# Patient Record
Sex: MALE | Race: WHITE
[De-identification: names, ages, dates, MRNs, and addresses within clinical notes are randomized per-mention and may not be internally consistent; named-entity substitution may affect disease eponyms.]

---

## 2018-07-11 ENCOUNTER — HOSPITAL ENCOUNTER (EMERGENCY)
Dept: HOSPITAL 26 - MED | Age: 48
Discharge: LEFT BEFORE DISCHARGE | End: 2018-07-11
Payer: MEDICARE

## 2018-07-11 VITALS — SYSTOLIC BLOOD PRESSURE: 136 MMHG | DIASTOLIC BLOOD PRESSURE: 112 MMHG

## 2018-07-11 VITALS — BODY MASS INDEX: 34.53 KG/M2 | WEIGHT: 220 LBS | HEIGHT: 67 IN

## 2018-07-11 VITALS — DIASTOLIC BLOOD PRESSURE: 84 MMHG | SYSTOLIC BLOOD PRESSURE: 120 MMHG

## 2018-07-11 DIAGNOSIS — T78.2XXA: Primary | ICD-10-CM

## 2018-07-11 DIAGNOSIS — I10: ICD-10-CM
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
## 2018-07-11 DIAGNOSIS — R13.10: ICD-10-CM

## 2018-07-11 DIAGNOSIS — Z79.899: ICD-10-CM

## 2018-07-11 DIAGNOSIS — R06.02: ICD-10-CM

## 2018-07-11 DIAGNOSIS — R21: ICD-10-CM

## 2018-07-11 DIAGNOSIS — E87.6: ICD-10-CM

## 2018-07-11 DIAGNOSIS — D75.1: ICD-10-CM
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
## 2018-07-11 LAB
ALBUMIN FLD-MCNC: 4 G/DL (ref 3.4–5)
AMYLASE SERPL-CCNC: 47 U/L (ref 25–115)
ANION GAP SERPL CALCULATED.3IONS-SCNC: 16.7 MMOL/L (ref 8–16)
AST SERPL-CCNC: 27 U/L (ref 15–37)
BASOPHILS # BLD AUTO: 0.1 K/UL (ref 0–0.22)
BASOPHILS NFR BLD AUTO: 0.4 % (ref 0–2)
BILIRUB SERPL-MCNC: 0.8 MG/DL (ref 0–1)
BUN SERPL-MCNC: 16 MG/DL (ref 7–18)
CHLORIDE SERPL-SCNC: 102 MMOL/L (ref 98–107)
CHOLEST/HDLC SERPL: 4.1 {RATIO} (ref 1–4.5)
CO2 SERPL-SCNC: 25 MMOL/L (ref 21–32)
CREAT SERPL-MCNC: 1.4 MG/DL (ref 0.7–1.3)
EOSINOPHIL # BLD AUTO: 0.1 K/UL (ref 0–0.4)
EOSINOPHIL NFR BLD AUTO: 0.7 % (ref 0–4)
ERYTHROCYTE [DISTWIDTH] IN BLOOD BY AUTOMATED COUNT: 12.4 % (ref 11.6–13.7)
GFR SERPL CREATININE-BSD FRML MDRD: 70 ML/MIN (ref 90–?)
GLUCOSE SERPL-MCNC: 200 MG/DL (ref 74–106)
HCT VFR BLD AUTO: 60.4 % (ref 36–52)
HDLC SERPL-MCNC: 38 MG/DL (ref 40–60)
HGB BLD-MCNC: 20.1 G/DL (ref 12–18)
LDLC SERPL CALC-MCNC: 63 MG/DL (ref 60–100)
LIPASE SERPL-CCNC: 306 U/L (ref 73–393)
LYMPHOCYTES # BLD AUTO: 5.8 K/UL (ref 2–11.5)
LYMPHOCYTES NFR BLD AUTO: 32.7 % (ref 20.5–51.1)
MAGNESIUM SERPL-MCNC: 2 MG/DL (ref 1.8–2.4)
MCH RBC QN AUTO: 32 PG (ref 27–31)
MCHC RBC AUTO-ENTMCNC: 33 G/DL (ref 33–37)
MCV RBC AUTO: 94.9 FL (ref 80–94)
MONOCYTES # BLD AUTO: 0.5 K/UL (ref 0.8–1)
MONOCYTES NFR BLD AUTO: 2.9 % (ref 1.7–9.3)
NEUTROPHILS # BLD AUTO: 11.1 K/UL (ref 1.8–7.7)
NEUTROPHILS NFR BLD AUTO: 63.3 % (ref 42.2–75.2)
PHOSPHATE SERPL-MCNC: 4.5 MG/DL (ref 2.5–4.9)
PLATELET # BLD AUTO: 198 K/UL (ref 140–450)
POTASSIUM SERPL-SCNC: 2.7 MMOL/L (ref 3.5–5.1)
PROTHROMBIN TIME: 11 SECS (ref 10.8–13.4)
RBC # BLD AUTO: 6.37 MIL/UL (ref 4.2–6.1)
SODIUM SERPL-SCNC: 141 MMOL/L (ref 136–145)
T4 FREE SERPL-MCNC: 0.96 NG/DL (ref 0.76–1.46)
TRIGL SERPL-MCNC: 275 MG/DL (ref 30–150)
TSH SERPL DL<=0.05 MIU/L-ACNC: 4.6 UIU/ML (ref 0.34–3.74)
WBC # BLD AUTO: 17.6 K/UL (ref 4.8–10.8)

## 2018-07-11 PROCEDURE — 80053 COMPREHEN METABOLIC PANEL: CPT

## 2018-07-11 PROCEDURE — 84443 ASSAY THYROID STIM HORMONE: CPT

## 2018-07-11 PROCEDURE — 84100 ASSAY OF PHOSPHORUS: CPT

## 2018-07-11 PROCEDURE — 36415 COLL VENOUS BLD VENIPUNCTURE: CPT

## 2018-07-11 PROCEDURE — 84484 ASSAY OF TROPONIN QUANT: CPT

## 2018-07-11 PROCEDURE — 85730 THROMBOPLASTIN TIME PARTIAL: CPT

## 2018-07-11 PROCEDURE — 83690 ASSAY OF LIPASE: CPT

## 2018-07-11 PROCEDURE — 71045 X-RAY EXAM CHEST 1 VIEW: CPT

## 2018-07-11 PROCEDURE — 99291 CRITICAL CARE FIRST HOUR: CPT

## 2018-07-11 PROCEDURE — 85025 COMPLETE CBC W/AUTO DIFF WBC: CPT

## 2018-07-11 PROCEDURE — 85610 PROTHROMBIN TIME: CPT

## 2018-07-11 PROCEDURE — 80061 LIPID PANEL: CPT

## 2018-07-11 PROCEDURE — 96372 THER/PROPH/DIAG INJ SC/IM: CPT

## 2018-07-11 PROCEDURE — 84439 ASSAY OF FREE THYROXINE: CPT

## 2018-07-11 PROCEDURE — 82150 ASSAY OF AMYLASE: CPT

## 2018-07-11 PROCEDURE — 83880 ASSAY OF NATRIURETIC PEPTIDE: CPT

## 2018-07-11 PROCEDURE — 94640 AIRWAY INHALATION TREATMENT: CPT

## 2018-07-11 PROCEDURE — 83735 ASSAY OF MAGNESIUM: CPT

## 2018-07-11 PROCEDURE — 82948 REAGENT STRIP/BLOOD GLUCOSE: CPT

## 2018-07-11 PROCEDURE — 83036 HEMOGLOBIN GLYCOSYLATED A1C: CPT

## 2018-07-11 NOTE — NUR
pt leaving AMA at this time. Pt does not want to stay over night in the 
hospital. Pt educated on why it is vital to stay for monitoring. Pt agrred to 
chest xray prior to leaving. ER MD notified and to speak with pt.

## 2018-07-11 NOTE — NUR
47 YO M BIBA W/ SEVERE AND UNKNOWN ALLERGIC REACTION TO BEE STING WHILE 
GARDENING. EPI 2MG IM,BENADRYL 25 MG IV, ALBUTEROL TX GIVEN FIELD. 
SOB/DIFFICULT = TO SWALLOW ON SCENE. PER VERBAL ORDER, 0.3 MG EPI TO BE GIVEN 
NOW SUBQ, AND SOLU-MEDROL 125MG IM GIVEN NOW AND RT CALLED FOR ADDITIONAL 
BREATHING TREATMENT. PT O2 SAT AT 98/97% AT THIS TIME. PERIO-ORBITAL SWELLING 
BILATERALLY AND SWOLLEN LIPS. PT REPORTS THAT HE FEELS THROAT TIGHTNESS, BUT IS 
ABLE TO SPEAK IN FULL, APPROPRIATE, AND COMPLETE SENTENCES. PT PLACED NEAR 
NURSES STATION FOR CLOSE MONITORING. VSS AT THIS TIME. LUNG ZAVALA W/ BILATERAL 
WHEEZING, BUT IMPROVED SINCE ARRIVAL. PT AAOX4. GCS 15. CMS INTACT, RR SLOWING, 
BUT STILL TACHYPNIC. ER MD LYNN AT BEDSIDE. DTR AT BEDSIDE. SAFETY 
PRECAUTIONS IN PLACE. WILL CONTINUE TO MONIOR.

## 2018-07-11 NOTE — NUR
ADMTTING DX: ALLERGIC REACTIONS PATIENT DENIES COPD/ASTHMA AWAKE AND ALERT 
EDUCTIOON PROVIDED TO NAHEED WITH ACKNOWLEDGEMENT ON [facility] THERAPY AND 
RESPIRATORY [facility] THERAPY GIVEN AS OREDERED TOLERATED WEL WITHOUT ADVERES 
REACTIONS NOTED

## 2018-07-11 NOTE — NUR
CRITICAL LAB VALUE RECEIVED AT THIS TIME. HEMAGLOBIN 20.1, HEMATICRIT 60.4, AND 
POTASSIUM 2.7. ER MD LYNN NOTIFIED.

## 2018-07-11 NOTE — NUR
PT RR MORE EVEN AND UNLABORED AT THIS TIME. LUNG ZAVALA ARE MORE CLEAR AT THIS 
TIME. VSS. SAFETY PRECAUTIONS IN PLACE. WILL CONTINUE TO MONITOR.

## 2019-07-03 ENCOUNTER — HOSPITAL ENCOUNTER (EMERGENCY)
Dept: HOSPITAL 26 - MED | Age: 49
Discharge: HOME | End: 2019-07-03
Payer: COMMERCIAL

## 2019-07-03 VITALS — BODY MASS INDEX: 36.57 KG/M2 | WEIGHT: 233 LBS | HEIGHT: 67 IN

## 2019-07-03 VITALS — DIASTOLIC BLOOD PRESSURE: 86 MMHG | SYSTOLIC BLOOD PRESSURE: 134 MMHG

## 2019-07-03 VITALS — DIASTOLIC BLOOD PRESSURE: 93 MMHG | SYSTOLIC BLOOD PRESSURE: 143 MMHG

## 2019-07-03 DIAGNOSIS — Z76.0: ICD-10-CM

## 2019-07-03 DIAGNOSIS — M54.2: ICD-10-CM

## 2019-07-03 DIAGNOSIS — E11.9: ICD-10-CM

## 2019-07-03 DIAGNOSIS — Z79.899: ICD-10-CM

## 2019-07-03 DIAGNOSIS — I10: Primary | ICD-10-CM

## 2019-07-03 DIAGNOSIS — R51: ICD-10-CM

## 2019-07-03 NOTE — NUR
PT BIB SELF WITH C/O NECK PAIN/HEADACHES X 2 DAYS 1/10, ACHEY/"ANNOYING". 
DENIES N/V/D/FEVER. PT REPORTS FEELING LIKE HIS NECK IS STIFF AND HE THINKS HIS 
HEADACHES ARE DUE TO NOT HAVING HIS BP MEDICATION RIGHT NOW BECAUSE HE RAN OUT. 
PT ABLE TO TO MOVE HIS NECK RT AND LFT WITHOUT PROBLEM. STATES TO HAVE SLIGHT 
CHEST PAIN 1/10. DENIES ANY SOB . PUT IN BEDSIDE MONITOR. ER MD TO SEE THE PT.





HX: HTN, HIGH CHOLESTEROL

RX: ATENOLOL, SIMVISTATIN, ASPIRIN, HYDROCHLOROTHIAZIDE

## 2019-07-03 NOTE — NUR
Patient discharged with v/s stable. Written and verbal after care instructions 
given and explained. 

Patient alert, oriented and verbalized understanding of instructions. 
Ambulatory with steady gait. All questions addressed prior to discharge. ID 
band removed. Patient advised to follow up with PMD. Rx of ATENOLOL given. 
Patient educated on indication of medication including possible reaction and 
side effects. Opportunity to ask questions provided and answered.

## 2022-12-14 ENCOUNTER — HOSPITAL ENCOUNTER (EMERGENCY)
Dept: HOSPITAL 26 - MED | Age: 52
Discharge: HOME | End: 2022-12-14
Payer: COMMERCIAL

## 2022-12-14 VITALS — SYSTOLIC BLOOD PRESSURE: 130 MMHG | DIASTOLIC BLOOD PRESSURE: 85 MMHG

## 2022-12-14 VITALS — WEIGHT: 230 LBS | BODY MASS INDEX: 36.1 KG/M2 | HEIGHT: 67 IN

## 2022-12-14 DIAGNOSIS — I10: ICD-10-CM

## 2022-12-14 DIAGNOSIS — Z79.899: ICD-10-CM

## 2022-12-14 DIAGNOSIS — H66.93: Primary | ICD-10-CM

## 2022-12-14 DIAGNOSIS — E78.5: ICD-10-CM
